# Patient Record
Sex: MALE | Race: WHITE | NOT HISPANIC OR LATINO | Employment: UNEMPLOYED | URBAN - METROPOLITAN AREA
[De-identification: names, ages, dates, MRNs, and addresses within clinical notes are randomized per-mention and may not be internally consistent; named-entity substitution may affect disease eponyms.]

---

## 2023-05-27 LAB
ALANINE AMINOTRANSFERASE (SGPT) (U/L) IN SER/PLAS: 30 U/L (ref 10–52)
ALBUMIN (G/DL) IN SER/PLAS: 4 G/DL (ref 3.4–5)
ALKALINE PHOSPHATASE (U/L) IN SER/PLAS: 51 U/L (ref 33–120)
ANION GAP IN SER/PLAS: 12 MMOL/L (ref 10–20)
ASPARTATE AMINOTRANSFERASE (SGOT) (U/L) IN SER/PLAS: 26 U/L (ref 9–39)
BASOPHILS (10*3/UL) IN BLOOD BY AUTOMATED COUNT: 0.13 X10E9/L (ref 0–0.1)
BASOPHILS/100 LEUKOCYTES IN BLOOD BY AUTOMATED COUNT: 1.1 % (ref 0–2)
BILIRUBIN TOTAL (MG/DL) IN SER/PLAS: 1 MG/DL (ref 0–1.2)
CALCIUM (MG/DL) IN SER/PLAS: 9 MG/DL (ref 8.6–10.3)
CARBON DIOXIDE, TOTAL (MMOL/L) IN SER/PLAS: 27 MMOL/L (ref 21–32)
CHLORIDE (MMOL/L) IN SER/PLAS: 102 MMOL/L (ref 98–107)
CHOLESTEROL (MG/DL) IN SER/PLAS: 99 MG/DL (ref 0–199)
CHOLESTEROL IN HDL (MG/DL) IN SER/PLAS: 32.5 MG/DL
CHOLESTEROL/HDL RATIO: 3
CREATININE (MG/DL) IN SER/PLAS: 1.43 MG/DL (ref 0.5–1.3)
EOSINOPHILS (10*3/UL) IN BLOOD BY AUTOMATED COUNT: 0.77 X10E9/L (ref 0–0.7)
EOSINOPHILS/100 LEUKOCYTES IN BLOOD BY AUTOMATED COUNT: 6.2 % (ref 0–6)
ERYTHROCYTE DISTRIBUTION WIDTH (RATIO) BY AUTOMATED COUNT: 14.1 % (ref 11.5–14.5)
ERYTHROCYTE MEAN CORPUSCULAR HEMOGLOBIN CONCENTRATION (G/DL) BY AUTOMATED: 31.1 G/DL (ref 32–36)
ERYTHROCYTE MEAN CORPUSCULAR VOLUME (FL) BY AUTOMATED COUNT: 96 FL (ref 80–100)
ERYTHROCYTES (10*6/UL) IN BLOOD BY AUTOMATED COUNT: 4.13 X10E12/L (ref 4.5–5.9)
GFR MALE: 60 ML/MIN/1.73M2
GLUCOSE (MG/DL) IN SER/PLAS: 135 MG/DL (ref 74–99)
HEMATOCRIT (%) IN BLOOD BY AUTOMATED COUNT: 39.5 % (ref 41–52)
HEMOGLOBIN (G/DL) IN BLOOD: 12.3 G/DL (ref 13.5–17.5)
IMMATURE GRANULOCYTES/100 LEUKOCYTES IN BLOOD BY AUTOMATED COUNT: 0.4 % (ref 0–0.9)
LDL: 35 MG/DL (ref 0–99)
LEUKOCYTES (10*3/UL) IN BLOOD BY AUTOMATED COUNT: 12.4 X10E9/L (ref 4.4–11.3)
LYMPHOCYTES (10*3/UL) IN BLOOD BY AUTOMATED COUNT: 3.89 X10E9/L (ref 1.2–4.8)
LYMPHOCYTES/100 LEUKOCYTES IN BLOOD BY AUTOMATED COUNT: 31.4 % (ref 13–44)
MONOCYTES (10*3/UL) IN BLOOD BY AUTOMATED COUNT: 0.81 X10E9/L (ref 0.1–1)
MONOCYTES/100 LEUKOCYTES IN BLOOD BY AUTOMATED COUNT: 6.5 % (ref 2–10)
NEUTROPHILS (10*3/UL) IN BLOOD BY AUTOMATED COUNT: 6.73 X10E9/L (ref 1.2–7.7)
NEUTROPHILS/100 LEUKOCYTES IN BLOOD BY AUTOMATED COUNT: 54.4 % (ref 40–80)
NRBC (PER 100 WBCS) BY AUTOMATED COUNT: 0 /100 WBC (ref 0–0)
PLATELETS (10*3/UL) IN BLOOD AUTOMATED COUNT: 308 X10E9/L (ref 150–450)
POTASSIUM (MMOL/L) IN SER/PLAS: 4.4 MMOL/L (ref 3.5–5.3)
PROSTATE SPECIFIC AG (NG/ML) IN SER/PLAS: 0.36 NG/ML (ref 0–4)
PROTEIN TOTAL: 8 G/DL (ref 6.4–8.2)
SODIUM (MMOL/L) IN SER/PLAS: 137 MMOL/L (ref 136–145)
THYROTROPIN (MIU/L) IN SER/PLAS BY DETECTION LIMIT <= 0.05 MIU/L: 1.66 MIU/L (ref 0.44–3.98)
THYROXINE (T4) FREE (NG/DL) IN SER/PLAS: 0.9 NG/DL (ref 0.61–1.12)
TRIGLYCERIDE (MG/DL) IN SER/PLAS: 157 MG/DL (ref 0–149)
UREA NITROGEN (MG/DL) IN SER/PLAS: 22 MG/DL (ref 6–23)
VLDL: 31 MG/DL (ref 0–40)

## 2023-05-28 LAB
ESTIMATED AVERAGE GLUCOSE FOR HBA1C: 157 MG/DL
HEMOGLOBIN A1C/HEMOGLOBIN TOTAL IN BLOOD: 7.1 %

## 2023-12-11 ENCOUNTER — LAB (OUTPATIENT)
Dept: LAB | Facility: LAB | Age: 48
End: 2023-12-11
Payer: COMMERCIAL

## 2023-12-11 DIAGNOSIS — D64.9 ANEMIA, UNSPECIFIED: ICD-10-CM

## 2023-12-11 DIAGNOSIS — N18.30 CHRONIC KIDNEY DISEASE, STAGE 3 UNSPECIFIED (MULTI): ICD-10-CM

## 2023-12-11 DIAGNOSIS — E03.9 HYPOTHYROIDISM, UNSPECIFIED: ICD-10-CM

## 2023-12-11 DIAGNOSIS — E11.9 TYPE 2 DIABETES MELLITUS WITHOUT COMPLICATIONS (MULTI): ICD-10-CM

## 2023-12-11 DIAGNOSIS — L89.509 PRESSURE ULCER OF UNSPECIFIED ANKLE, UNSPECIFIED STAGE: Primary | ICD-10-CM

## 2023-12-11 LAB
ALBUMIN SERPL BCP-MCNC: 3.7 G/DL (ref 3.4–5)
ALP SERPL-CCNC: 58 U/L (ref 33–120)
ALT SERPL W P-5'-P-CCNC: 29 U/L (ref 10–52)
ANION GAP SERPL CALC-SCNC: 11 MMOL/L (ref 10–20)
AST SERPL W P-5'-P-CCNC: 21 U/L (ref 9–39)
BASOPHILS # BLD AUTO: 0.1 X10*3/UL (ref 0–0.1)
BASOPHILS NFR BLD AUTO: 0.9 %
BILIRUB SERPL-MCNC: 1.1 MG/DL (ref 0–1.2)
BUN SERPL-MCNC: 13 MG/DL (ref 6–23)
CALCIUM SERPL-MCNC: 8.7 MG/DL (ref 8.6–10.3)
CHLORIDE SERPL-SCNC: 102 MMOL/L (ref 98–107)
CHOLEST SERPL-MCNC: 90 MG/DL (ref 0–199)
CHOLESTEROL/HDL RATIO: 3.2
CO2 SERPL-SCNC: 29 MMOL/L (ref 21–32)
CREAT SERPL-MCNC: 1.2 MG/DL (ref 0.5–1.3)
EOSINOPHIL # BLD AUTO: 0.79 X10*3/UL (ref 0–0.7)
EOSINOPHIL NFR BLD AUTO: 7 %
ERYTHROCYTE [DISTWIDTH] IN BLOOD BY AUTOMATED COUNT: 14 % (ref 11.5–14.5)
EST. AVERAGE GLUCOSE BLD GHB EST-MCNC: 169 MG/DL
GFR SERPL CREATININE-BSD FRML MDRD: 74 ML/MIN/1.73M*2
GLUCOSE SERPL-MCNC: 140 MG/DL (ref 74–99)
HBA1C MFR BLD: 7.5 %
HCT VFR BLD AUTO: 38.8 % (ref 41–52)
HDLC SERPL-MCNC: 27.7 MG/DL
HGB BLD-MCNC: 12.4 G/DL (ref 13.5–17.5)
IMM GRANULOCYTES # BLD AUTO: 0.04 X10*3/UL (ref 0–0.7)
IMM GRANULOCYTES NFR BLD AUTO: 0.4 % (ref 0–0.9)
LDLC SERPL CALC-MCNC: 31 MG/DL
LYMPHOCYTES # BLD AUTO: 3.69 X10*3/UL (ref 1.2–4.8)
LYMPHOCYTES NFR BLD AUTO: 32.5 %
MCH RBC QN AUTO: 29 PG (ref 26–34)
MCHC RBC AUTO-ENTMCNC: 32 G/DL (ref 32–36)
MCV RBC AUTO: 91 FL (ref 80–100)
MONOCYTES # BLD AUTO: 0.75 X10*3/UL (ref 0.1–1)
MONOCYTES NFR BLD AUTO: 6.6 %
NEUTROPHILS # BLD AUTO: 5.99 X10*3/UL (ref 1.2–7.7)
NEUTROPHILS NFR BLD AUTO: 52.6 %
NON HDL CHOLESTEROL: 62 MG/DL (ref 0–149)
NRBC BLD-RTO: 0 /100 WBCS (ref 0–0)
PLATELET # BLD AUTO: 325 X10*3/UL (ref 150–450)
POTASSIUM SERPL-SCNC: 4.2 MMOL/L (ref 3.5–5.3)
PROT SERPL-MCNC: 7.1 G/DL (ref 6.4–8.2)
RBC # BLD AUTO: 4.28 X10*6/UL (ref 4.5–5.9)
SODIUM SERPL-SCNC: 138 MMOL/L (ref 136–145)
T4 FREE SERPL-MCNC: 0.94 NG/DL (ref 0.61–1.12)
TRIGL SERPL-MCNC: 159 MG/DL (ref 0–149)
TSH SERPL-ACNC: 1.35 MIU/L (ref 0.44–3.98)
VLDL: 32 MG/DL (ref 0–40)
WBC # BLD AUTO: 11.4 X10*3/UL (ref 4.4–11.3)

## 2023-12-11 PROCEDURE — 84439 ASSAY OF FREE THYROXINE: CPT

## 2023-12-11 PROCEDURE — 36415 COLL VENOUS BLD VENIPUNCTURE: CPT

## 2023-12-11 PROCEDURE — 84443 ASSAY THYROID STIM HORMONE: CPT

## 2023-12-11 PROCEDURE — 80053 COMPREHEN METABOLIC PANEL: CPT

## 2023-12-11 PROCEDURE — 80061 LIPID PANEL: CPT

## 2023-12-11 PROCEDURE — 83036 HEMOGLOBIN GLYCOSYLATED A1C: CPT

## 2023-12-11 PROCEDURE — 85025 COMPLETE CBC W/AUTO DIFF WBC: CPT

## 2024-04-15 DIAGNOSIS — I87.2 VENOUS INSUFFICIENCY: ICD-10-CM

## 2024-06-21 ENCOUNTER — LAB (OUTPATIENT)
Dept: LAB | Facility: LAB | Age: 49
End: 2024-06-21
Payer: COMMERCIAL

## 2024-06-21 DIAGNOSIS — I10 ESSENTIAL (PRIMARY) HYPERTENSION: ICD-10-CM

## 2024-06-21 DIAGNOSIS — E11.65 TYPE 2 DIABETES MELLITUS WITH HYPERGLYCEMIA (MULTI): Primary | ICD-10-CM

## 2024-06-21 LAB
ALBUMIN SERPL BCP-MCNC: 4.2 G/DL (ref 3.4–5)
ALP SERPL-CCNC: 53 U/L (ref 33–120)
ALT SERPL W P-5'-P-CCNC: 31 U/L (ref 10–52)
ANION GAP SERPL CALC-SCNC: 14 MMOL/L (ref 10–20)
AST SERPL W P-5'-P-CCNC: 30 U/L (ref 9–39)
BASOPHILS # BLD AUTO: 0.12 X10*3/UL (ref 0–0.1)
BASOPHILS NFR BLD AUTO: 1.1 %
BILIRUB SERPL-MCNC: 1 MG/DL (ref 0–1.2)
BUN SERPL-MCNC: 17 MG/DL (ref 6–23)
CALCIUM SERPL-MCNC: 9.2 MG/DL (ref 8.6–10.3)
CHLORIDE SERPL-SCNC: 102 MMOL/L (ref 98–107)
CO2 SERPL-SCNC: 28 MMOL/L (ref 21–32)
CREAT SERPL-MCNC: 1.17 MG/DL (ref 0.5–1.3)
CREAT UR-MCNC: 166.2 MG/DL (ref 20–370)
EGFRCR SERPLBLD CKD-EPI 2021: 75 ML/MIN/1.73M*2
EOSINOPHIL # BLD AUTO: 0.76 X10*3/UL (ref 0–0.7)
EOSINOPHIL NFR BLD AUTO: 6.7 %
ERYTHROCYTE [DISTWIDTH] IN BLOOD BY AUTOMATED COUNT: 13.8 % (ref 11.5–14.5)
EST. AVERAGE GLUCOSE BLD GHB EST-MCNC: 183 MG/DL
GLUCOSE SERPL-MCNC: 138 MG/DL (ref 74–99)
HBA1C MFR BLD: 8 %
HCT VFR BLD AUTO: 41.3 % (ref 41–52)
HGB BLD-MCNC: 13.2 G/DL (ref 13.5–17.5)
IMM GRANULOCYTES # BLD AUTO: 0.08 X10*3/UL (ref 0–0.7)
IMM GRANULOCYTES NFR BLD AUTO: 0.7 % (ref 0–0.9)
LYMPHOCYTES # BLD AUTO: 3.89 X10*3/UL (ref 1.2–4.8)
LYMPHOCYTES NFR BLD AUTO: 34.1 %
MCH RBC QN AUTO: 29.6 PG (ref 26–34)
MCHC RBC AUTO-ENTMCNC: 32 G/DL (ref 32–36)
MCV RBC AUTO: 93 FL (ref 80–100)
MICROALBUMIN UR-MCNC: <7 MG/L
MICROALBUMIN/CREAT UR: NORMAL MG/G{CREAT}
MONOCYTES # BLD AUTO: 0.71 X10*3/UL (ref 0.1–1)
MONOCYTES NFR BLD AUTO: 6.2 %
NEUTROPHILS # BLD AUTO: 5.85 X10*3/UL (ref 1.2–7.7)
NEUTROPHILS NFR BLD AUTO: 51.2 %
NRBC BLD-RTO: 0 /100 WBCS (ref 0–0)
PLATELET # BLD AUTO: 327 X10*3/UL (ref 150–450)
POTASSIUM SERPL-SCNC: 4.2 MMOL/L (ref 3.5–5.3)
PROT SERPL-MCNC: 7.4 G/DL (ref 6.4–8.2)
PSA SERPL-MCNC: 0.46 NG/ML
RBC # BLD AUTO: 4.46 X10*6/UL (ref 4.5–5.9)
SODIUM SERPL-SCNC: 140 MMOL/L (ref 136–145)
T4 FREE SERPL-MCNC: 0.78 NG/DL (ref 0.61–1.12)
TSH SERPL-ACNC: 1.91 MIU/L (ref 0.44–3.98)
WBC # BLD AUTO: 11.4 X10*3/UL (ref 4.4–11.3)

## 2024-06-21 PROCEDURE — 80053 COMPREHEN METABOLIC PANEL: CPT

## 2024-06-21 PROCEDURE — 36415 COLL VENOUS BLD VENIPUNCTURE: CPT

## 2024-06-21 PROCEDURE — 83036 HEMOGLOBIN GLYCOSYLATED A1C: CPT

## 2024-06-21 PROCEDURE — 84153 ASSAY OF PSA TOTAL: CPT

## 2024-06-21 PROCEDURE — 84443 ASSAY THYROID STIM HORMONE: CPT

## 2024-06-21 PROCEDURE — 82043 UR ALBUMIN QUANTITATIVE: CPT

## 2024-06-21 PROCEDURE — 84439 ASSAY OF FREE THYROXINE: CPT

## 2024-06-21 PROCEDURE — 82570 ASSAY OF URINE CREATININE: CPT

## 2024-06-21 PROCEDURE — 85025 COMPLETE CBC W/AUTO DIFF WBC: CPT

## 2024-09-21 ENCOUNTER — LAB (OUTPATIENT)
Dept: LAB | Facility: LAB | Age: 49
End: 2024-09-21
Payer: COMMERCIAL

## 2024-09-21 DIAGNOSIS — D72.829 ELEVATED WHITE BLOOD CELL COUNT, UNSPECIFIED: Primary | ICD-10-CM

## 2024-09-21 LAB
BASOPHILS # BLD AUTO: 0.14 X10*3/UL (ref 0–0.1)
BASOPHILS NFR BLD AUTO: 1.3 %
EOSINOPHIL # BLD AUTO: 0.82 X10*3/UL (ref 0–0.7)
EOSINOPHIL NFR BLD AUTO: 7.5 %
ERYTHROCYTE [DISTWIDTH] IN BLOOD BY AUTOMATED COUNT: 13.7 % (ref 11.5–14.5)
HCT VFR BLD AUTO: 42.4 % (ref 41–52)
HGB BLD-MCNC: 13.3 G/DL (ref 13.5–17.5)
IMM GRANULOCYTES # BLD AUTO: 0.05 X10*3/UL (ref 0–0.7)
IMM GRANULOCYTES NFR BLD AUTO: 0.5 % (ref 0–0.9)
LYMPHOCYTES # BLD AUTO: 3.24 X10*3/UL (ref 1.2–4.8)
LYMPHOCYTES NFR BLD AUTO: 29.5 %
MCH RBC QN AUTO: 29.2 PG (ref 26–34)
MCHC RBC AUTO-ENTMCNC: 31.4 G/DL (ref 32–36)
MCV RBC AUTO: 93 FL (ref 80–100)
MONOCYTES # BLD AUTO: 0.85 X10*3/UL (ref 0.1–1)
MONOCYTES NFR BLD AUTO: 7.7 %
NEUTROPHILS # BLD AUTO: 5.9 X10*3/UL (ref 1.2–7.7)
NEUTROPHILS NFR BLD AUTO: 53.5 %
NRBC BLD-RTO: 0 /100 WBCS (ref 0–0)
PLATELET # BLD AUTO: 319 X10*3/UL (ref 150–450)
RBC # BLD AUTO: 4.55 X10*6/UL (ref 4.5–5.9)
WBC # BLD AUTO: 11 X10*3/UL (ref 4.4–11.3)

## 2024-09-21 PROCEDURE — 85025 COMPLETE CBC W/AUTO DIFF WBC: CPT

## 2024-09-21 PROCEDURE — 36415 COLL VENOUS BLD VENIPUNCTURE: CPT

## 2024-12-13 ENCOUNTER — LAB (OUTPATIENT)
Dept: LAB | Facility: LAB | Age: 49
End: 2024-12-13
Payer: COMMERCIAL

## 2024-12-13 DIAGNOSIS — I10 ESSENTIAL (PRIMARY) HYPERTENSION: ICD-10-CM

## 2024-12-13 DIAGNOSIS — N39.0 URINARY TRACT INFECTION, SITE NOT SPECIFIED: ICD-10-CM

## 2024-12-13 DIAGNOSIS — E11.65 TYPE 2 DIABETES MELLITUS WITH HYPERGLYCEMIA (MULTI): Primary | ICD-10-CM

## 2024-12-13 DIAGNOSIS — R31.9 HEMATURIA, UNSPECIFIED: ICD-10-CM

## 2024-12-13 DIAGNOSIS — D64.9 ANEMIA, UNSPECIFIED: ICD-10-CM

## 2024-12-13 DIAGNOSIS — N18.30 CHRONIC KIDNEY DISEASE, STAGE 3 UNSPECIFIED (MULTI): ICD-10-CM

## 2024-12-13 LAB
ALBUMIN SERPL BCP-MCNC: 4 G/DL (ref 3.4–5)
ALP SERPL-CCNC: 59 U/L (ref 33–120)
ALT SERPL W P-5'-P-CCNC: 39 U/L (ref 10–52)
ANION GAP SERPL CALC-SCNC: 14 MMOL/L (ref 10–20)
APPEARANCE UR: CLEAR
AST SERPL W P-5'-P-CCNC: 37 U/L (ref 9–39)
BACTERIA #/AREA URNS AUTO: ABNORMAL /HPF
BILIRUB SERPL-MCNC: 0.9 MG/DL (ref 0–1.2)
BILIRUB UR STRIP.AUTO-MCNC: NEGATIVE MG/DL
BUN SERPL-MCNC: 18 MG/DL (ref 6–23)
CALCIUM SERPL-MCNC: 9.7 MG/DL (ref 8.6–10.3)
CHLORIDE SERPL-SCNC: 100 MMOL/L (ref 98–107)
CO2 SERPL-SCNC: 28 MMOL/L (ref 21–32)
COLOR UR: ABNORMAL
CREAT SERPL-MCNC: 1.2 MG/DL (ref 0.5–1.3)
CREAT UR-MCNC: 91.9 MG/DL (ref 20–370)
EGFRCR SERPLBLD CKD-EPI 2021: 73 ML/MIN/1.73M*2
EST. AVERAGE GLUCOSE BLD GHB EST-MCNC: 183 MG/DL
GLUCOSE SERPL-MCNC: 206 MG/DL (ref 74–99)
GLUCOSE UR STRIP.AUTO-MCNC: NORMAL MG/DL
HBA1C MFR BLD: 8 %
HYALINE CASTS #/AREA URNS AUTO: ABNORMAL /LPF
KETONES UR STRIP.AUTO-MCNC: NEGATIVE MG/DL
LEUKOCYTE ESTERASE UR QL STRIP.AUTO: ABNORMAL
MICROALBUMIN UR-MCNC: <7 MG/L
MICROALBUMIN/CREAT UR: NORMAL MG/G{CREAT}
MUCOUS THREADS #/AREA URNS AUTO: ABNORMAL /LPF
NITRITE UR QL STRIP.AUTO: ABNORMAL
PH UR STRIP.AUTO: 5.5 [PH]
POTASSIUM SERPL-SCNC: 4.1 MMOL/L (ref 3.5–5.3)
PROT SERPL-MCNC: 7.3 G/DL (ref 6.4–8.2)
PROT UR STRIP.AUTO-MCNC: NEGATIVE MG/DL
RBC # UR STRIP.AUTO: NEGATIVE /UL
RBC #/AREA URNS AUTO: ABNORMAL /HPF
SODIUM SERPL-SCNC: 138 MMOL/L (ref 136–145)
SP GR UR STRIP.AUTO: 1.01
SQUAMOUS #/AREA URNS AUTO: ABNORMAL /HPF
UROBILINOGEN UR STRIP.AUTO-MCNC: NORMAL MG/DL
WBC #/AREA URNS AUTO: ABNORMAL /HPF

## 2024-12-13 PROCEDURE — 36415 COLL VENOUS BLD VENIPUNCTURE: CPT

## 2024-12-13 PROCEDURE — 83036 HEMOGLOBIN GLYCOSYLATED A1C: CPT

## 2024-12-13 PROCEDURE — 82043 UR ALBUMIN QUANTITATIVE: CPT

## 2024-12-13 PROCEDURE — 82570 ASSAY OF URINE CREATININE: CPT

## 2024-12-13 PROCEDURE — 80053 COMPREHEN METABOLIC PANEL: CPT

## 2024-12-13 PROCEDURE — 81001 URINALYSIS AUTO W/SCOPE: CPT

## 2024-12-16 DIAGNOSIS — N39.0 URINARY TRACT INFECTION, SITE NOT SPECIFIED: Primary | ICD-10-CM

## 2024-12-16 DIAGNOSIS — R31.9 HEMATURIA, UNSPECIFIED: ICD-10-CM

## 2025-04-10 ENCOUNTER — HOSPITAL ENCOUNTER (EMERGENCY)
Facility: HOSPITAL | Age: 50
Discharge: HOME | End: 2025-04-10
Attending: STUDENT IN AN ORGANIZED HEALTH CARE EDUCATION/TRAINING PROGRAM
Payer: COMMERCIAL

## 2025-04-10 VITALS
DIASTOLIC BLOOD PRESSURE: 94 MMHG | TEMPERATURE: 98.2 F | HEART RATE: 84 BPM | BODY MASS INDEX: 37.19 KG/M2 | WEIGHT: 315 LBS | HEIGHT: 77 IN | OXYGEN SATURATION: 97 % | RESPIRATION RATE: 19 BRPM | SYSTOLIC BLOOD PRESSURE: 151 MMHG

## 2025-04-10 DIAGNOSIS — N30.00 ACUTE CYSTITIS WITHOUT HEMATURIA: Primary | ICD-10-CM

## 2025-04-10 LAB
ANION GAP SERPL CALC-SCNC: 14 MMOL/L (ref 10–20)
APPEARANCE UR: CLEAR
BACTERIA #/AREA URNS AUTO: ABNORMAL /HPF
BASOPHILS # BLD AUTO: 0.1 X10*3/UL (ref 0–0.1)
BASOPHILS NFR BLD AUTO: 0.7 %
BILIRUB UR STRIP.AUTO-MCNC: NEGATIVE MG/DL
BNP SERPL-MCNC: 15 PG/ML (ref 0–99)
BUN SERPL-MCNC: 15 MG/DL (ref 6–23)
CALCIUM SERPL-MCNC: 9.6 MG/DL (ref 8.6–10.3)
CHLORIDE SERPL-SCNC: 101 MMOL/L (ref 98–107)
CO2 SERPL-SCNC: 28 MMOL/L (ref 21–32)
COLOR UR: ABNORMAL
CREAT SERPL-MCNC: 1.24 MG/DL (ref 0.5–1.3)
EGFRCR SERPLBLD CKD-EPI 2021: 70 ML/MIN/1.73M*2
EOSINOPHIL # BLD AUTO: 0.92 X10*3/UL (ref 0–0.7)
EOSINOPHIL NFR BLD AUTO: 6.9 %
ERYTHROCYTE [DISTWIDTH] IN BLOOD BY AUTOMATED COUNT: 14.3 % (ref 11.5–14.5)
GLUCOSE SERPL-MCNC: 205 MG/DL (ref 74–99)
GLUCOSE UR STRIP.AUTO-MCNC: NORMAL MG/DL
HCT VFR BLD AUTO: 38.5 % (ref 41–52)
HGB BLD-MCNC: 12.3 G/DL (ref 13.5–17.5)
IMM GRANULOCYTES # BLD AUTO: 0.04 X10*3/UL (ref 0–0.7)
IMM GRANULOCYTES NFR BLD AUTO: 0.3 % (ref 0–0.9)
KETONES UR STRIP.AUTO-MCNC: NEGATIVE MG/DL
LEUKOCYTE ESTERASE UR QL STRIP.AUTO: ABNORMAL
LYMPHOCYTES # BLD AUTO: 3.24 X10*3/UL (ref 1.2–4.8)
LYMPHOCYTES NFR BLD AUTO: 24.2 %
MCH RBC QN AUTO: 28.8 PG (ref 26–34)
MCHC RBC AUTO-ENTMCNC: 31.9 G/DL (ref 32–36)
MCV RBC AUTO: 90 FL (ref 80–100)
MONOCYTES # BLD AUTO: 0.66 X10*3/UL (ref 0.1–1)
MONOCYTES NFR BLD AUTO: 4.9 %
NEUTROPHILS # BLD AUTO: 8.43 X10*3/UL (ref 1.2–7.7)
NEUTROPHILS NFR BLD AUTO: 63 %
NITRITE UR QL STRIP.AUTO: NEGATIVE
NRBC BLD-RTO: 0 /100 WBCS (ref 0–0)
PH UR STRIP.AUTO: 5.5 [PH]
PLATELET # BLD AUTO: 312 X10*3/UL (ref 150–450)
POTASSIUM SERPL-SCNC: 3.6 MMOL/L (ref 3.5–5.3)
PROT UR STRIP.AUTO-MCNC: NEGATIVE MG/DL
RBC # BLD AUTO: 4.27 X10*6/UL (ref 4.5–5.9)
RBC # UR STRIP.AUTO: NEGATIVE MG/DL
RBC #/AREA URNS AUTO: ABNORMAL /HPF
SODIUM SERPL-SCNC: 139 MMOL/L (ref 136–145)
SP GR UR STRIP.AUTO: 1.02
SQUAMOUS #/AREA URNS AUTO: ABNORMAL /HPF
UROBILINOGEN UR STRIP.AUTO-MCNC: NORMAL MG/DL
WBC # BLD AUTO: 13.4 X10*3/UL (ref 4.4–11.3)
WBC #/AREA URNS AUTO: ABNORMAL /HPF
WBC CLUMPS #/AREA URNS AUTO: ABNORMAL /HPF

## 2025-04-10 PROCEDURE — 85025 COMPLETE CBC W/AUTO DIFF WBC: CPT | Performed by: STUDENT IN AN ORGANIZED HEALTH CARE EDUCATION/TRAINING PROGRAM

## 2025-04-10 PROCEDURE — 87086 URINE CULTURE/COLONY COUNT: CPT | Mod: PARLAB | Performed by: STUDENT IN AN ORGANIZED HEALTH CARE EDUCATION/TRAINING PROGRAM

## 2025-04-10 PROCEDURE — 2500000002 HC RX 250 W HCPCS SELF ADMINISTERED DRUGS (ALT 637 FOR MEDICARE OP, ALT 636 FOR OP/ED): Performed by: STUDENT IN AN ORGANIZED HEALTH CARE EDUCATION/TRAINING PROGRAM

## 2025-04-10 PROCEDURE — 83880 ASSAY OF NATRIURETIC PEPTIDE: CPT | Performed by: STUDENT IN AN ORGANIZED HEALTH CARE EDUCATION/TRAINING PROGRAM

## 2025-04-10 PROCEDURE — 36415 COLL VENOUS BLD VENIPUNCTURE: CPT | Performed by: STUDENT IN AN ORGANIZED HEALTH CARE EDUCATION/TRAINING PROGRAM

## 2025-04-10 PROCEDURE — 99283 EMERGENCY DEPT VISIT LOW MDM: CPT | Performed by: STUDENT IN AN ORGANIZED HEALTH CARE EDUCATION/TRAINING PROGRAM

## 2025-04-10 PROCEDURE — 81001 URINALYSIS AUTO W/SCOPE: CPT | Performed by: STUDENT IN AN ORGANIZED HEALTH CARE EDUCATION/TRAINING PROGRAM

## 2025-04-10 PROCEDURE — 80048 BASIC METABOLIC PNL TOTAL CA: CPT | Performed by: STUDENT IN AN ORGANIZED HEALTH CARE EDUCATION/TRAINING PROGRAM

## 2025-04-10 RX ORDER — SULFAMETHOXAZOLE AND TRIMETHOPRIM 800; 160 MG/1; MG/1
1 TABLET ORAL EVERY 12 HOURS
Qty: 14 TABLET | Refills: 0 | Status: SHIPPED | OUTPATIENT
Start: 2025-04-10 | End: 2025-04-17

## 2025-04-10 RX ORDER — SULFAMETHOXAZOLE AND TRIMETHOPRIM 800; 160 MG/1; MG/1
1 TABLET ORAL ONCE
Status: COMPLETED | OUTPATIENT
Start: 2025-04-10 | End: 2025-04-10

## 2025-04-10 RX ADMIN — SULFAMETHOXAZOLE AND TRIMETHOPRIM 1 TABLET: 800; 160 TABLET ORAL at 21:24

## 2025-04-10 ASSESSMENT — LIFESTYLE VARIABLES
HAVE YOU EVER FELT YOU SHOULD CUT DOWN ON YOUR DRINKING: NO
EVER HAD A DRINK FIRST THING IN THE MORNING TO STEADY YOUR NERVES TO GET RID OF A HANGOVER: NO
HAVE PEOPLE ANNOYED YOU BY CRITICIZING YOUR DRINKING: NO
EVER FELT BAD OR GUILTY ABOUT YOUR DRINKING: NO
TOTAL SCORE: 0

## 2025-04-10 ASSESSMENT — COLUMBIA-SUICIDE SEVERITY RATING SCALE - C-SSRS
2. HAVE YOU ACTUALLY HAD ANY THOUGHTS OF KILLING YOURSELF?: NO
6. HAVE YOU EVER DONE ANYTHING, STARTED TO DO ANYTHING, OR PREPARED TO DO ANYTHING TO END YOUR LIFE?: NO
1. IN THE PAST MONTH, HAVE YOU WISHED YOU WERE DEAD OR WISHED YOU COULD GO TO SLEEP AND NOT WAKE UP?: NO

## 2025-04-10 NOTE — ED TRIAGE NOTES
Pt has had a UTI and was treated with oral antibiotics. He states that he was feeling better and did not finish the course of antibiotics and symptoms returned.

## 2025-04-11 LAB — HOLD SPECIMEN: NORMAL

## 2025-04-11 NOTE — ED PROVIDER NOTES
History of Present Illness     History provided by: Patient  Limitations to History: None  External Records Reviewed with Brief Summary: Outpatient progress note from 12/23/24 which showed pcp eval for chronic conditions    HPI:  Adams Morris is a 49 y.o. male with a past medical tree of HTN, T2DM, CKD, HLD and recurrent UTIs who presents with foul-smelling urine.  The patient notes that for the past few days has been developing worsening odorous urine.  Notes having some decreased urination as well.  Notes he is compliant with all of his other medications.  He did not complete his previous course of antibiotics.  He did start taking his antibiotics again yesterday.  Denies any fevers, back pain, abdominal pain, nausea/vomiting, dysuria or polyuria.    Physical Exam   Triage vitals:  T 36.8 °C (98.2 °F)  HR 86  /69  RR 16  O2 94 % None (Room air)    General: Well appearing and in no acute distress.  HEENT: NCAT. PERRL. Oropharynx pink and moist.  CV: Regular rate, regular rhythm.   Resp: Normal effort.  GI: Soft.   Extremities: 1+ bilateral extremity edema. 2+ radial pulses bilaterally.  Neuro: Moving all extremities bilaterally.  Psych: Appropriate mood and affect    Medical Decision Making & ED Course   Medical Decision Making:  This is a 49 y.o. male with a past medical tree of HTN, T2DM, CKD, HLD and recurrent UTIs who presents with foul-smelling urine.    ----    Differential diagnoses considered include but are not limited to: acute cystitis, pyelonephritis, daniel, interstitial cystitis, hyponatremia     Social Determinants of Health which Significantly Impact Care: None identified     EKG Independent Interpretation: EKG not obtained    Independent Result Review and Interpretation: Relevant laboratory and radiographic results were reviewed and independently interpreted by myself.  As necessary, they are commented on in the ED Course.    Chronic conditions affecting the patient's care: As  documented above in [facility]    The patient was discussed with the following consultants/services: None    Care Considerations: As documented above in [facility]    ED Course:  Diagnoses as of 04/10/25 2111   Acute cystitis without hematuria     Disposition   As a result of the work-up, the patient was discharged home.  he was informed of his diagnosis and instructed to come back with any concerns or worsening of condition.  he and was agreeable to the plan as discussed above.  he was given the opportunity to ask questions.  All of the patient's questions were answered.    Santosh Deng MD  Emergency Medicine     Santosh Deng MD  04/10/25 2117     No

## 2025-04-12 LAB — BACTERIA UR CULT: NORMAL

## 2025-04-26 NOTE — PROGRESS NOTES
Subjective   Patient ID: Adams Morris is a 49 y.o. male who presents for EVALUATION O FA PERSISTENT UTI FOR THE PAST TWO MONTHS. PT HAS BEEN TREATED WITH ANTIBIOTICS IN THE PAST   HPI:  Are you experiencing:  Burning on urination -- NO  Pain on urination  -- NO  Urinary frequency -- EVERY 2-3 HOURS  Urinary urgency -- WITH DIURETIC THERAPY   Urge incontinence -- NO  Urinary stress incontinence  -- NO  Number of pads used per day --NONE  Enuresis -- NO  Nocturia-- 4-5 X ON AVG  Hematuria -- NO  Hesitancy -- YES  Post void fullness --  YES  Strength of your stream-- PT DOES NOT KNOW    ROS:  General-- No C/O fever or chills  Head-- No C/O Dizziness  Eyes-- NO  C/O blurry or double vision  Ears-- No C/O hearing loss  Neck-- Supple  Chest-- No C/O pain or discomfort  Lungs-- No C/O shortness of breath  Abdomen-- No C/O  pain or discomfort, PT C/O  nausea -- ? FROM OZEMPIC Back-- No C/O back pain or discomfort  Extremities-- No C/O swelling or pain    OBJECTIVE  General-- well-developed, well-nourished in NAD  Head-- normal cephalic, atraumatic  Eyes-- PERRL, EOM'S FROM, no jaundice  Neck-- Supple, without masses  Chest-- Normal bony structure  Abdomen-- soft, non tender, liver spleen not palpable. No suprapubic masses.--MORBIDLY OBESE, 3+PANNUS  Back-- no flank masses palpable, no CVA tenderness on palpation or percussion  Lymph nodes-- No inguinal lymphadenopathy noted  Prostate--1 +, firm, smooth, non-tender, without nodules  Testis-- both down, non-tender, without masses  Epididymis-- no masses palpable  Scrotum -- no hydrocele noted  Extremities -- Normal muscle mass and tone for the patients age  Neurological-- oriented times thre    PSA:  6/21/2024--0.46  5/27/2023--0.36    90 MIN PVR -- 79 ML    ASSESSMENT / PLAN  A:  REC UTI'S  PATHOPHYSIOLOGY OF THE ABOVE AND OPTIONS OF FURTHER W/U AND THERAPY DISCUSSED IN DETAIL WITH THE PT  ALL QUESTIONS ANSWERED   REMOTE H/O SEPSIS YEARS AGO    H/O HGB AIC-- 8.0 --  THIS  IS CONTRIBUTING TO HIS H/O REC  UTI'S   PT WEIGHT 500 # -- UNABLE TO DO A CAT SCAN OF THE ABD AND PELVIS   P:  BEGIN:  EMPIRICAL THERAPY WITH TRIMETHOPRIM 100 MG AT HS  D-MANNOS WITH CRANBERRY--ONE CAP BID   F/U IN 3 MONTHS  TO SEE HOW HE IS DOING   Darek Suarez MD 04/26/25 1:03 PM

## 2025-04-28 ENCOUNTER — APPOINTMENT (OUTPATIENT)
Age: 50
End: 2025-04-28
Payer: COMMERCIAL

## 2025-04-28 VITALS — DIASTOLIC BLOOD PRESSURE: 74 MMHG | SYSTOLIC BLOOD PRESSURE: 163 MMHG | TEMPERATURE: 98.9 F | HEART RATE: 71 BPM

## 2025-04-28 DIAGNOSIS — R35.1 NOCTURIA: ICD-10-CM

## 2025-04-28 DIAGNOSIS — E66.01 MORBID OBESITY (MULTI): ICD-10-CM

## 2025-04-28 DIAGNOSIS — R39.15 URGENCY OF MICTURITION: ICD-10-CM

## 2025-04-28 DIAGNOSIS — R39.11 HESITANCY OF MICTURITION: ICD-10-CM

## 2025-04-28 DIAGNOSIS — N39.0 RECURRENT UTI: ICD-10-CM

## 2025-04-28 DIAGNOSIS — R35.0 FREQUENCY OF MICTURITION: Primary | ICD-10-CM

## 2025-04-28 PROCEDURE — 99204 OFFICE O/P NEW MOD 45 MIN: CPT | Performed by: UROLOGY

## 2025-04-28 PROCEDURE — 51798 US URINE CAPACITY MEASURE: CPT | Performed by: UROLOGY

## 2025-04-28 PROCEDURE — 1036F TOBACCO NON-USER: CPT | Performed by: UROLOGY

## 2025-04-28 RX ORDER — SEMAGLUTIDE 0.68 MG/ML
0.5 INJECTION, SOLUTION SUBCUTANEOUS
COMMUNITY
Start: 2024-09-11

## 2025-04-28 RX ORDER — METOPROLOL SUCCINATE 200 MG/1
1 TABLET, EXTENDED RELEASE ORAL DAILY
COMMUNITY
Start: 2017-05-09

## 2025-04-28 RX ORDER — GABAPENTIN 300 MG/1
300 CAPSULE ORAL
COMMUNITY
Start: 2023-07-24

## 2025-04-28 RX ORDER — ROSUVASTATIN CALCIUM 10 MG/1
1 TABLET, COATED ORAL NIGHTLY
COMMUNITY
Start: 2024-11-01

## 2025-04-28 RX ORDER — LOSARTAN POTASSIUM AND HYDROCHLOROTHIAZIDE 25; 100 MG/1; MG/1
TABLET ORAL
COMMUNITY
Start: 2016-12-16

## 2025-04-28 RX ORDER — ALBUTEROL SULFATE 90 UG/1
INHALANT RESPIRATORY (INHALATION)
COMMUNITY

## 2025-04-28 RX ORDER — CELECOXIB 200 MG/1
200 CAPSULE ORAL
COMMUNITY
Start: 2016-03-21

## 2025-04-28 RX ORDER — TORSEMIDE 100 MG/1
0.5 TABLET ORAL 2 TIMES DAILY
COMMUNITY
Start: 2018-01-02

## 2025-04-28 RX ORDER — TRIMETHOPRIM 100 MG/1
100 TABLET ORAL DAILY
Qty: 90 TABLET | Refills: 3 | Status: SHIPPED | OUTPATIENT
Start: 2025-04-28 | End: 2026-04-23

## 2025-04-28 RX ORDER — HUMAN INSULIN 100 [IU]/ML
INJECTION, SUSPENSION SUBCUTANEOUS
COMMUNITY

## 2025-04-28 RX ORDER — PEN NEEDLE, DIABETIC 30 GX3/16"
NEEDLE, DISPOSABLE MISCELLANEOUS
COMMUNITY
Start: 2023-08-31

## 2025-04-28 RX ORDER — IBUPROFEN 200 MG
CAPSULE ORAL
COMMUNITY
Start: 2023-02-20

## 2025-04-28 RX ORDER — METFORMIN HYDROCHLORIDE 500 MG/1
1 TABLET ORAL EVERY 12 HOURS
COMMUNITY
Start: 2023-05-23

## 2025-04-28 RX ORDER — SPIRONOLACTONE 25 MG/1
1 TABLET ORAL DAILY
COMMUNITY
Start: 2024-08-05

## 2025-04-28 RX ORDER — POTASSIUM CHLORIDE 1500 MG/1
1 TABLET, EXTENDED RELEASE ORAL 3 TIMES DAILY
COMMUNITY
Start: 2024-05-07

## 2025-04-28 RX ORDER — BUDESONIDE AND FORMOTEROL FUMARATE DIHYDRATE 160; 4.5 UG/1; UG/1
2 AEROSOL RESPIRATORY (INHALATION)
COMMUNITY

## 2025-04-28 RX ORDER — FEBUXOSTAT 80 MG/1
80 TABLET, FILM COATED ORAL
COMMUNITY
Start: 2013-04-16

## 2025-04-28 RX ORDER — AMLODIPINE BESYLATE 5 MG/1
5 TABLET ORAL DAILY
COMMUNITY
Start: 2025-03-20

## 2025-04-28 NOTE — LETTER
April 29, 2025     Pedro Montenegro MD  [address]  [address]  [facility] [id number]    Patient: Adams Morris   YOB: 1975   Date of Visit: 4/28/2025       Dear Dr. Pedro Montenegro MD:    Thank you for referring Adams Morris to me for evaluation. Below are my notes for this consultation.  If you have questions, please do not hesitate to call me. I look forward to following your patient along with you.       Sincerely,     Darek Suarez MD      CC: No Recipients  ______________________________________________________________________________________    Subjective  Patient ID: Adams Morris is a 49 y.o. male who presents for EVALUATION O FA PERSISTENT UTI FOR THE PAST TWO MONTHS. PT HAS BEEN TREATED WITH ANTIBIOTICS IN THE PAST   HPI:  Are you experiencing:  Burning on urination -- NO  Pain on urination  -- NO  Urinary frequency -- EVERY 2-3 HOURS  Urinary urgency -- WITH DIURETIC THERAPY   Urge incontinence -- NO  Urinary stress incontinence  -- NO  Number of pads used per day --NONE  Enuresis -- NO  Nocturia-- 4-5 X ON AVG  Hematuria -- NO  Hesitancy -- YES  Post void fullness --  YES  Strength of your stream-- PT DOES NOT KNOW    ROS:  General-- No C/O fever or chills  Head-- No C/O Dizziness  Eyes-- NO  C/O blurry or double vision  Ears-- No C/O hearing loss  Neck-- Supple  Chest-- No C/O pain or discomfort  Lungs-- No C/O shortness of breath  Abdomen-- No C/O  pain or discomfort, PT C/O  nausea -- ? FROM OZEMPIC Back-- No C/O back pain or discomfort  Extremities-- No C/O swelling or pain    OBJECTIVE  General-- well-developed, well-nourished in NAD  Head-- normal cephalic, atraumatic  Eyes-- PERRL, EOM'S FROM, no jaundice  Neck-- Supple, without masses  Chest-- Normal bony structure  Abdomen-- soft, non tender, liver spleen not palpable. No suprapubic masses.--MORBIDLY OBESE, 3+PANNUS  Back-- no flank masses palpable, no CVA tenderness on palpation or percussion  Lymph nodes-- No  inguinal lymphadenopathy noted  Prostate--1 +, firm, smooth, non-tender, without nodules  Testis-- both down, non-tender, without masses  Epididymis-- no masses palpable  Scrotum -- no hydrocele noted  Extremities -- Normal muscle mass and tone for the patients age  Neurological-- oriented times thre    PSA:  6/21/2024--0.46  5/27/2023--0.36    90 MIN PVR -- 79 ML    ASSESSMENT / PLAN  A:  REC UTI'S  PATHOPHYSIOLOGY OF THE ABOVE AND OPTIONS OF FURTHER W/U AND THERAPY DISCUSSED IN DETAIL WITH THE PT  ALL QUESTIONS ANSWERED   REMOTE H/O SEPSIS YEARS AGO    H/O HGB AIC-- 8.0 -- THIS  IS CONTRIBUTING TO HIS H/O REC  UTI'S   PT WEIGHT 500 # -- UNABLE TO DO A CAT SCAN OF THE ABD AND PELVIS   P:  BEGIN:  EMPIRICAL THERAPY WITH TRIMETHOPRIM 100 MG AT HS  D-MANNOS WITH CRANBERRY--ONE CAP BID   F/U IN 3 MONTHS  TO SEE HOW HE IS DOING   Darek Suarez MD 04/26/25 1:03 PM

## 2025-07-27 NOTE — PROGRESS NOTES
Subjective   Patient ID: Adams Morris is a 50 y.o. male who presents for EVALUATION OF A PERSISTENT UTI FOR THE PAST TWO MONTHS. PT HAS BEEN ON PROPHYLACTIC ANTIBIOTIC THERAPY WITH TRIMETHOPRIM 100 MG AT HS FOR THE PAST TWO MONTHS.  THE PT FEELS HE  IS BACK TO NORMAL AT THIS TIME .  HPI:  Are you experiencing:  Burning on urination -- NO  Pain on urination  -- NO  Urinary frequency -- EVERY 2-3 HOURS WITH DIURETIC THERAPY   Urinary urgency -- WITH DIURETIC THERAPY   Urge incontinence -- NO  Urinary stress incontinence  -- NO  Number of pads used per day --NONE  Enuresis -- NO  Nocturia-- 4-5 X ON AVG  Hematuria -- NO  Hesitancy -- NO  Post void fullness --NO  Strength of your stream-- PT DOES NOT KNOW-- PT SITS TO VOID     PSA:  6/21/2024--0.46  5/27/2023--0.36     90 MIN PVR -- 79 ML    URINALYSIS DIPSTICK--  PT COULD NOT GO      ASSESSMENT / PLAN  A:  H/O REC UTI'S -- PT HAS RESPONDED WELL TO PROPHYLACTIC ANTIBIOTIC THERAPY   PATHOPHYSIOLOGY OF THE ABOVE AND OPTIONS OF FURTHER W/U AND THERAPY DISCUSSED IN DETAIL WITH THE PT  ALL QUESTIONS ANSWERED   REMOTE H/O SEPSIS YEARS AGO     H/O HGB AIC-- 8.0 -- THIS  IS CONTRIBUTING TO HIS H/O REC  UTI'S   PT WEIGHT 500 # -- UNABLE TO DO A CAT SCAN OF THE ABD AND PELVIS   P:  WILL STOP THE PROPHYLACTIC ANTIBIOTIC THERAPY FOR NOW PER THE PT'S REQUEST  CONTINUE:  D-MANNOS WITH CRANBERRY--ONE CAP BID -- NOT TAKING IT EVERY DAY   PT TO KEEP CAFFEINE TO A MIN  PT TO TRY TO DRINK AT LEAST 4 -6 EIGHT OZ GLASSES OF WATER PER DAY   F/U ON AN OPEN DOOR POLICY  PT TO DROP OFF A U/A FOR A C & S IF SXS  OF A UTI OCCUR IN THE FUTURE     Darek Suarez MD 07/27/25 8:22 AM

## 2025-07-30 ENCOUNTER — APPOINTMENT (OUTPATIENT)
Age: 50
End: 2025-07-30
Payer: COMMERCIAL

## 2025-07-30 VITALS
DIASTOLIC BLOOD PRESSURE: 95 MMHG | TEMPERATURE: 98 F | SYSTOLIC BLOOD PRESSURE: 172 MMHG | BODY MASS INDEX: 38.36 KG/M2 | HEIGHT: 76 IN | WEIGHT: 315 LBS | HEART RATE: 82 BPM

## 2025-07-30 DIAGNOSIS — R35.0 FREQUENCY OF MICTURITION: Primary | ICD-10-CM

## 2025-07-30 DIAGNOSIS — R35.1 NOCTURIA: ICD-10-CM

## 2025-07-30 DIAGNOSIS — R39.15 URGENCY OF MICTURITION: ICD-10-CM

## 2025-07-30 DIAGNOSIS — N39.0 RECURRENT UTI: ICD-10-CM

## 2025-07-30 PROCEDURE — 1036F TOBACCO NON-USER: CPT | Performed by: UROLOGY

## 2025-07-30 PROCEDURE — 99213 OFFICE O/P EST LOW 20 MIN: CPT | Performed by: UROLOGY

## 2025-07-30 PROCEDURE — 51798 US URINE CAPACITY MEASURE: CPT | Performed by: UROLOGY

## 2025-07-30 PROCEDURE — 3008F BODY MASS INDEX DOCD: CPT | Performed by: UROLOGY

## 2025-07-30 RX ORDER — LOSARTAN POTASSIUM 100 MG/1
100 TABLET ORAL DAILY
COMMUNITY
Start: 2025-07-29

## 2025-07-30 RX ORDER — HUMAN INSULIN 100 [IU]/ML
INJECTION, SOLUTION SUBCUTANEOUS
COMMUNITY
Start: 2025-07-23

## 2025-07-30 ASSESSMENT — PATIENT HEALTH QUESTIONNAIRE - PHQ9
SUM OF ALL RESPONSES TO PHQ9 QUESTIONS 1 AND 2: 0
2. FEELING DOWN, DEPRESSED OR HOPELESS: NOT AT ALL
1. LITTLE INTEREST OR PLEASURE IN DOING THINGS: NOT AT ALL

## 2025-07-30 NOTE — LETTER
August 1, 2025     Pedro Montenegro MD  [address]  [address]  [facility] [address]    Patient: Adams Morris   YOB: 1975   Date of Visit: 7/30/2025       Dear Dr. Pedro Motnenegro MD:    Thank you for referring Adams Morris to me for evaluation. Below are my notes for this consultation.  If you have questions, please do not hesitate to call me. I look forward to following your patient along with you.       Sincerely,     Darek Suarez MD      CC: No Recipients  ______________________________________________________________________________________    Subjective  Patient ID: Adams Morris is a 50 y.o. male who presents for EVALUATION OF A PERSISTENT UTI FOR THE PAST TWO MONTHS. PT HAS BEEN ON PROPHYLACTIC ANTIBIOTIC THERAPY WITH TRIMETHOPRIM 100 MG AT  FOR THE PAST TWO MONTHS.  THE PT FEELS HE  IS BACK TO NORMAL AT THIS TIME .  HPI:  Are you experiencing:  Burning on urination -- NO  Pain on urination  -- NO  Urinary frequency -- EVERY 2-3 HOURS WITH DIURETIC THERAPY   Urinary urgency -- WITH DIURETIC THERAPY   Urge incontinence -- NO  Urinary stress incontinence  -- NO  Number of pads used per day --NONE  Enuresis -- NO  Nocturia-- 4-5 X ON AVG  Hematuria -- NO  Hesitancy -- NO  Post void fullness --NO  Strength of your stream-- PT DOES NOT KNOW-- PT SITS TO VOID     PSA:  6/21/2024--0.46  5/27/2023--0.36     90 MIN PVR -- 79 ML    URINALYSIS DIPSTICK--  PT COULD NOT GO      ASSESSMENT / PLAN  A:  H/O REC UTI'S -- PT HAS RESPONDED WELL TO PROPHYLACTIC ANTIBIOTIC THERAPY   PATHOPHYSIOLOGY OF THE ABOVE AND OPTIONS OF FURTHER W/U AND THERAPY DISCUSSED IN DETAIL WITH THE PT  ALL QUESTIONS ANSWERED   REMOTE H/O SEPSIS YEARS AGO     H/O HGB AIC-- 8.0 -- THIS  IS CONTRIBUTING TO HIS H/O REC  UTI'S   PT WEIGHT 500 # -- UNABLE TO DO A CAT SCAN OF THE ABD AND PELVIS   P:  WILL STOP THE PROPHYLACTIC ANTIBIOTIC THERAPY FOR NOW PER THE PT'S REQUEST  CONTINUE:  D-MANNOS WITH CRANBERRY--ONE CAP BID --  NOT TAKING IT EVERY DAY   PT TO KEEP CAFFEINE TO A MIN  PT TO TRY TO DRINK AT LEAST 4 -6 EIGHT OZ GLASSES OF WATER PER DAY   F/U ON AN OPEN DOOR POLICY  PT TO DROP OFF A U/A FOR A C & S IF SXS  OF A UTI OCCUR IN THE FUTURE     Darek Suarez MD 07/27/25 8:22 AM